# Patient Record
Sex: FEMALE | Race: WHITE | NOT HISPANIC OR LATINO | Employment: PART TIME | URBAN - METROPOLITAN AREA
[De-identification: names, ages, dates, MRNs, and addresses within clinical notes are randomized per-mention and may not be internally consistent; named-entity substitution may affect disease eponyms.]

---
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
## 2023-01-13 ENCOUNTER — TRANSFERRED RECORDS (OUTPATIENT)
Dept: HEALTH INFORMATION MANAGEMENT | Facility: CLINIC | Age: 18
End: 2023-01-13

## 2024-01-05 NOTE — TELEPHONE ENCOUNTER
FUTURE VISIT INFORMATION      FUTURE VISIT INFORMATION:  Date: 4/3/24  Time: 8 AM  Location: [facility] [place]  REFERRAL INFORMATION:  Referring provider:    Referring providers clinic:    Reason for visit/diagnosis:  Cant breathe through left nostril. Appt per pt. Pt unsure of where med recs are. [facility] confirmed.     RECORDS REQUESTED FROM      Clinic name Comments Records Status Imaging Status   [facility] 12/7/23 OV with Jake Rangel MD  CE    [facility]  MRN: [id number]  9/11/23 OV with Howie Young Jr., MD  6/13/23 OV with Miguel West MD    4/14/23 OV with Micheline James PA-C   CE    [facility] 1/25/22 -12/6/22 notes- Samy Dalton MD  1/13/23 notes- Mark Betts MD    4/1/2022 Septoplasty, Submucous resection of inferior turbinates Send to scan 1/8/24

## 2024-02-05 ENCOUNTER — TELEPHONE (OUTPATIENT)
Dept: OTOLARYNGOLOGY | Facility: CLINIC | Age: 19
End: 2024-02-05
Payer: COMMERCIAL

## 2024-04-03 ENCOUNTER — PRE VISIT (OUTPATIENT)
Dept: OTOLARYNGOLOGY | Facility: CLINIC | Age: 19
End: 2024-04-03